# Patient Record
Sex: MALE | Race: BLACK OR AFRICAN AMERICAN | NOT HISPANIC OR LATINO | Employment: FULL TIME | URBAN - METROPOLITAN AREA
[De-identification: names, ages, dates, MRNs, and addresses within clinical notes are randomized per-mention and may not be internally consistent; named-entity substitution may affect disease eponyms.]

---

## 2017-03-30 ENCOUNTER — ANESTHESIA EVENT (OUTPATIENT)
Dept: SURGERY | Facility: HOSPITAL | Age: 22
End: 2017-03-30
Payer: COMMERCIAL

## 2017-03-30 ENCOUNTER — HOSPITAL ENCOUNTER (OUTPATIENT)
Facility: HOSPITAL | Age: 22
Discharge: HOME | End: 2017-03-31
Attending: EMERGENCY MEDICINE | Admitting: HOSPITALIST
Payer: COMMERCIAL

## 2017-03-30 ENCOUNTER — SURGERY (OUTPATIENT)
Age: 22
End: 2017-03-30

## 2017-03-30 ENCOUNTER — ANESTHESIA (OUTPATIENT)
Dept: SURGERY | Facility: HOSPITAL | Age: 22
End: 2017-03-30
Payer: OTHER MISCELLANEOUS

## 2017-03-30 DIAGNOSIS — T14.90XA TRAUMA: Primary | ICD-10-CM

## 2017-03-30 PROBLEM — S92.912B: Status: ACTIVE | Noted: 2017-03-30

## 2017-03-30 PROBLEM — S92.322B: Status: ACTIVE | Noted: 2017-03-30

## 2017-03-30 PROBLEM — S92.312B: Status: ACTIVE | Noted: 2017-03-30

## 2017-03-30 LAB
ALBUMIN SERPL BCP-MCNC: 4.7 G/DL
ALP SERPL-CCNC: 72 U/L
ALT SERPL W/O P-5'-P-CCNC: 18 U/L
ANION GAP SERPL CALC-SCNC: 10 MMOL/L
AST SERPL-CCNC: 33 U/L
BASOPHILS # BLD AUTO: 0 K/UL
BASOPHILS NFR BLD: 0.1 %
BILIRUB SERPL-MCNC: 0.7 MG/DL
BUN SERPL-MCNC: 8 MG/DL
CALCIUM SERPL-MCNC: 10.1 MG/DL
CHLORIDE SERPL-SCNC: 100 MMOL/L
CO2 SERPL-SCNC: 26 MMOL/L
CREAT SERPL-MCNC: 1.1 MG/DL
DIFFERENTIAL METHOD: ABNORMAL
EOSINOPHIL # BLD AUTO: 0 K/UL
EOSINOPHIL NFR BLD: 0 %
ERYTHROCYTE [DISTWIDTH] IN BLOOD BY AUTOMATED COUNT: 12.5 %
EST. GFR  (AFRICAN AMERICAN): >60 ML/MIN/1.73 M^2
EST. GFR  (NON AFRICAN AMERICAN): >60 ML/MIN/1.73 M^2
GLUCOSE SERPL-MCNC: 89 MG/DL
HCT VFR BLD AUTO: 46.1 %
HGB BLD-MCNC: 15.3 G/DL
INR PPP: 1.1
LYMPHOCYTES # BLD AUTO: 1.4 K/UL
LYMPHOCYTES NFR BLD: 11.2 %
MCH RBC QN AUTO: 30.4 PG
MCHC RBC AUTO-ENTMCNC: 33.2 %
MCV RBC AUTO: 92 FL
MONOCYTES # BLD AUTO: 0.3 K/UL
MONOCYTES NFR BLD: 2.5 %
NEUTROPHILS # BLD AUTO: 10.4 K/UL
NEUTROPHILS NFR BLD: 86.2 %
PLATELET # BLD AUTO: 261 K/UL
PMV BLD AUTO: 7.6 FL
POTASSIUM SERPL-SCNC: 4.5 MMOL/L
PROT SERPL-MCNC: 8.2 G/DL
PROTHROMBIN TIME: 11.4 SEC
RBC # BLD AUTO: 5.03 M/UL
SODIUM SERPL-SCNC: 136 MMOL/L
WBC # BLD AUTO: 12.1 K/UL

## 2017-03-30 PROCEDURE — 63600175 PHARM REV CODE 636 W HCPCS: Performed by: ANESTHESIOLOGY

## 2017-03-30 PROCEDURE — 27200651 HC AIRWAY, LMA: Performed by: NURSE ANESTHETIST, CERTIFIED REGISTERED

## 2017-03-30 PROCEDURE — 25000003 PHARM REV CODE 250: Performed by: ANESTHESIOLOGY

## 2017-03-30 PROCEDURE — 99284 EMERGENCY DEPT VISIT MOD MDM: CPT

## 2017-03-30 PROCEDURE — G0378 HOSPITAL OBSERVATION PER HR: HCPCS

## 2017-03-30 PROCEDURE — 80053 COMPREHEN METABOLIC PANEL: CPT

## 2017-03-30 PROCEDURE — 25000003 PHARM REV CODE 250: Performed by: NURSE ANESTHETIST, CERTIFIED REGISTERED

## 2017-03-30 PROCEDURE — 36000707: Performed by: ORTHOPAEDIC SURGERY

## 2017-03-30 PROCEDURE — 20103 EXPL PENTRG WOUND EXTREMITY: CPT | Mod: LT,,, | Performed by: ORTHOPAEDIC SURGERY

## 2017-03-30 PROCEDURE — 25000003 PHARM REV CODE 250: Performed by: EMERGENCY MEDICINE

## 2017-03-30 PROCEDURE — D9220A PRA ANESTHESIA: Mod: ,,, | Performed by: ANESTHESIOLOGY

## 2017-03-30 PROCEDURE — 85610 PROTHROMBIN TIME: CPT

## 2017-03-30 PROCEDURE — 37000008 HC ANESTHESIA 1ST 15 MINUTES: Performed by: ORTHOPAEDIC SURGERY

## 2017-03-30 PROCEDURE — 37000009 HC ANESTHESIA EA ADD 15 MINS: Performed by: ORTHOPAEDIC SURGERY

## 2017-03-30 PROCEDURE — 99900104 DSU ONLY-NO CHARGE-EA ADD'L HR (STAT): Performed by: ORTHOPAEDIC SURGERY

## 2017-03-30 PROCEDURE — 36415 COLL VENOUS BLD VENIPUNCTURE: CPT

## 2017-03-30 PROCEDURE — 36000706: Performed by: ORTHOPAEDIC SURGERY

## 2017-03-30 PROCEDURE — 99900103 DSU ONLY-NO CHARGE-INITIAL HR (STAT): Performed by: ORTHOPAEDIC SURGERY

## 2017-03-30 PROCEDURE — 85025 COMPLETE CBC W/AUTO DIFF WBC: CPT

## 2017-03-30 PROCEDURE — 71000033 HC RECOVERY, INTIAL HOUR: Performed by: ORTHOPAEDIC SURGERY

## 2017-03-30 PROCEDURE — 63600175 PHARM REV CODE 636 W HCPCS: Performed by: NURSE ANESTHETIST, CERTIFIED REGISTERED

## 2017-03-30 RX ORDER — KETOROLAC TROMETHAMINE 30 MG/ML
30 INJECTION, SOLUTION INTRAMUSCULAR; INTRAVENOUS ONCE AS NEEDED
Status: COMPLETED | OUTPATIENT
Start: 2017-03-30 | End: 2017-03-30

## 2017-03-30 RX ORDER — FENTANYL CITRATE 50 UG/ML
INJECTION, SOLUTION INTRAMUSCULAR; INTRAVENOUS
Status: DISPENSED
Start: 2017-03-30 | End: 2017-03-31

## 2017-03-30 RX ORDER — PROPOFOL 10 MG/ML
VIAL (ML) INTRAVENOUS
Status: DISCONTINUED | OUTPATIENT
Start: 2017-03-30 | End: 2017-03-30

## 2017-03-30 RX ORDER — ONDANSETRON 2 MG/ML
4 INJECTION INTRAMUSCULAR; INTRAVENOUS DAILY PRN
Status: DISCONTINUED | OUTPATIENT
Start: 2017-03-30 | End: 2017-03-30 | Stop reason: HOSPADM

## 2017-03-30 RX ORDER — SODIUM CHLORIDE 0.9 % (FLUSH) 0.9 %
3 SYRINGE (ML) INJECTION
Status: DISCONTINUED | OUTPATIENT
Start: 2017-03-30 | End: 2017-03-31 | Stop reason: HOSPADM

## 2017-03-30 RX ORDER — SODIUM CHLORIDE, SODIUM LACTATE, POTASSIUM CHLORIDE, CALCIUM CHLORIDE 600; 310; 30; 20 MG/100ML; MG/100ML; MG/100ML; MG/100ML
INJECTION, SOLUTION INTRAVENOUS CONTINUOUS
Status: DISCONTINUED | OUTPATIENT
Start: 2017-03-30 | End: 2017-03-31

## 2017-03-30 RX ORDER — CEFAZOLIN SODIUM 1 G/3ML
INJECTION, POWDER, FOR SOLUTION INTRAMUSCULAR; INTRAVENOUS
Status: DISCONTINUED | OUTPATIENT
Start: 2017-03-30 | End: 2017-03-30

## 2017-03-30 RX ORDER — ONDANSETRON 4 MG/1
4 TABLET, ORALLY DISINTEGRATING ORAL
Status: COMPLETED | OUTPATIENT
Start: 2017-03-30 | End: 2017-03-30

## 2017-03-30 RX ORDER — AMOXICILLIN AND CLAVULANATE POTASSIUM 562.5; 437.5; 62.5 MG/1; MG/1; MG/1
2 TABLET, FILM COATED, EXTENDED RELEASE ORAL
Status: ON HOLD | COMMUNITY
Start: 2017-03-26 | End: 2017-03-31

## 2017-03-30 RX ORDER — LIDOCAINE HCL/PF 100 MG/5ML
SYRINGE (ML) INTRAVENOUS
Status: DISCONTINUED | OUTPATIENT
Start: 2017-03-30 | End: 2017-03-30

## 2017-03-30 RX ORDER — FENTANYL CITRATE 50 UG/ML
25 INJECTION, SOLUTION INTRAMUSCULAR; INTRAVENOUS EVERY 5 MIN PRN
Status: DISCONTINUED | OUTPATIENT
Start: 2017-03-30 | End: 2017-03-30 | Stop reason: HOSPADM

## 2017-03-30 RX ORDER — ACETAMINOPHEN 10 MG/ML
INJECTION, SOLUTION INTRAVENOUS
Status: DISCONTINUED | OUTPATIENT
Start: 2017-03-30 | End: 2017-03-30

## 2017-03-30 RX ORDER — CEFAZOLIN SODIUM 2 G/50ML
2 SOLUTION INTRAVENOUS ONCE
Status: DISCONTINUED | OUTPATIENT
Start: 2017-03-30 | End: 2017-03-30 | Stop reason: HOSPADM

## 2017-03-30 RX ORDER — SODIUM CHLORIDE 9 MG/ML
INJECTION, SOLUTION INTRAVENOUS CONTINUOUS
Status: DISCONTINUED | OUTPATIENT
Start: 2017-03-30 | End: 2017-03-31

## 2017-03-30 RX ORDER — FENTANYL CITRATE 50 UG/ML
INJECTION, SOLUTION INTRAMUSCULAR; INTRAVENOUS
Status: DISCONTINUED | OUTPATIENT
Start: 2017-03-30 | End: 2017-03-30

## 2017-03-30 RX ORDER — HYDROCODONE BITARTRATE AND ACETAMINOPHEN 5; 325 MG/1; MG/1
1 TABLET ORAL
Status: COMPLETED | OUTPATIENT
Start: 2017-03-30 | End: 2017-03-30

## 2017-03-30 RX ORDER — OXYCODONE AND ACETAMINOPHEN 5; 325 MG/1; MG/1
1 TABLET ORAL
Status: DISCONTINUED | OUTPATIENT
Start: 2017-03-30 | End: 2017-03-30 | Stop reason: HOSPADM

## 2017-03-30 RX ORDER — SODIUM CHLORIDE, SODIUM LACTATE, POTASSIUM CHLORIDE, CALCIUM CHLORIDE 600; 310; 30; 20 MG/100ML; MG/100ML; MG/100ML; MG/100ML
25 INJECTION, SOLUTION INTRAVENOUS CONTINUOUS
Status: DISCONTINUED | OUTPATIENT
Start: 2017-03-30 | End: 2017-03-31

## 2017-03-30 RX ORDER — CEPHALEXIN 250 MG/1
1000 CAPSULE ORAL
Status: COMPLETED | OUTPATIENT
Start: 2017-03-30 | End: 2017-03-30

## 2017-03-30 RX ORDER — FENTANYL CITRATE 50 UG/ML
25 INJECTION, SOLUTION INTRAMUSCULAR; INTRAVENOUS EVERY 10 MIN PRN
Status: DISCONTINUED | OUTPATIENT
Start: 2017-03-30 | End: 2017-03-31 | Stop reason: HOSPADM

## 2017-03-30 RX ADMIN — FENTANYL CITRATE 50 MCG: 50 INJECTION INTRAMUSCULAR; INTRAVENOUS at 08:03

## 2017-03-30 RX ADMIN — HYDROCODONE BITARTRATE AND ACETAMINOPHEN 1 TABLET: 5; 325 TABLET ORAL at 08:03

## 2017-03-30 RX ADMIN — KETOROLAC TROMETHAMINE 30 MG: 30 INJECTION, SOLUTION INTRAMUSCULAR at 08:03

## 2017-03-30 RX ADMIN — ACETAMINOPHEN 1000 MG: 10 INJECTION, SOLUTION INTRAVENOUS at 07:03

## 2017-03-30 RX ADMIN — FENTANYL CITRATE 25 MCG: 50 INJECTION INTRAMUSCULAR; INTRAVENOUS at 07:03

## 2017-03-30 RX ADMIN — FENTANYL CITRATE 50 MCG: 50 INJECTION INTRAMUSCULAR; INTRAVENOUS at 07:03

## 2017-03-30 RX ADMIN — FENTANYL CITRATE 25 MCG: 50 INJECTION INTRAMUSCULAR; INTRAVENOUS at 06:03

## 2017-03-30 RX ADMIN — SODIUM CHLORIDE, SODIUM LACTATE, POTASSIUM CHLORIDE, AND CALCIUM CHLORIDE: .6; .31; .03; .02 INJECTION, SOLUTION INTRAVENOUS at 06:03

## 2017-03-30 RX ADMIN — ONDANSETRON 4 MG: 4 TABLET, ORALLY DISINTEGRATING ORAL at 08:03

## 2017-03-30 RX ADMIN — CEPHALEXIN 1000 MG: 250 CAPSULE ORAL at 09:03

## 2017-03-30 RX ADMIN — CEFAZOLIN 2 G: 1 INJECTION, POWDER, FOR SOLUTION INTRAVENOUS at 07:03

## 2017-03-30 RX ADMIN — LIDOCAINE HYDROCHLORIDE 100 MG: 20 INJECTION, SOLUTION INTRAVENOUS at 07:03

## 2017-03-30 RX ADMIN — PROPOFOL 200 MG: 10 INJECTION, EMULSION INTRAVENOUS at 07:03

## 2017-03-30 NOTE — PROGRESS NOTES
Patient arrived on floor via stretcher. Personal belongings with mother at bedside. Left foot elevated on pillow. Patient complains of pain 4-5. Bed low, brakes locked, SR up x2, call light within reach.     03/30/17 1500   Vital Signs   Temp 97.5 °F (36.4 °C)   Temp src Oral   Pulse 76   Heart Rate Source Monitor   Resp 18   SpO2 100 %   Pulse Oximetry Type Intermittent   O2 Device (Oxygen Therapy) room air   BP (!) 152/91   MAP (mmHg) 120   BP Location Left arm   BP Method Automatic   Patient Position Lying

## 2017-03-30 NOTE — ED NOTES
When dressing taken down, still with significant bleeding from one puncture site. Lt foot again redressed and orthopedist (Teresa) called back @ this time.

## 2017-03-30 NOTE — ED NOTES
Dr. Moore (orthopedics) @ bedside. Still some heavy oozing noted from puncture wound, placed QuickClot to lesion & replaced with new pressure dressing secured by Coban. Plan is to keep leg elevate & wrapped x 1 hour & recheck for breakthrough bleeding. If bleeding continues at this point, Dr. Moore would like to be contacted again & reconsider possible surgical wound irrigation under general anesthesia

## 2017-03-30 NOTE — PLAN OF CARE
Problem: Patient Care Overview  Goal: Plan of Care Review  Outcome: Ongoing (interventions implemented as appropriate)  A&Ox4. Urinates clear, yellow urine using urinal at bedside. Left lower extremity elevated with one pillow. Awaiting surgery per Dr. Moore. VSS. Remained afebrile during my shift. Good pain control with non pharmacological methods. Mother at bedside. Bed low, brakes locked, SR up x2, call light within reach. Verbalizes understanding of care. Open communication utilized.

## 2017-03-30 NOTE — ASSESSMENT & PLAN NOTE
With uncontrolled bleeding. Pressure dressing applied. Ortho consulted. NPO for possible surgery.  Tetanus UTD.  Initiated on IV antibiotics pre op

## 2017-03-30 NOTE — H&P
"[facility] Hospital Medicine  History & Physical    Patient Name: Nathaniel Rolon  MRN: [id number]  Admission Date: 3/30/2017  Attending Physician: Nicholas Kwan MD   Primary Care Provider: No primary care provider on file.         Patient information was obtained from patient and ER records.     Subjective:     Principal Problem:Open fracture of first metatarsal bone of left foot    Chief Complaint:   Chief Complaint   Patient presents with    Foot Injury     LEFT foot crush injury        HPI: Nathaniel Rolon is a 21-year-old male with no significant PMH who presents to the ED for evaluation of injury to the left foot.  Patient states his foot was "caught between an electrical pallet magda and a pallet" earlier today at work. According to the ED note, patient has abrasion to his lateral aspect of the left foot with swelling there is a mild amount of bleeding from 2 less than 1 cm puncture wounds.  Patient has no other injuries or complaints at this time no family history no ALLERGIES his last tetanus was 2 years ago.     ED reports uncontrolled bleeding from puncture site requiring multiple changes of pressure dressing.       History reviewed. No pertinent past medical history.    Past Surgical History:   Procedure Laterality Date    APPENDECTOMY         Review of patient's allergies indicates:  No Known Allergies    No current facility-administered medications on file prior to encounter.      No current outpatient prescriptions on file prior to encounter.     Family History     Problem Relation (Age of Onset)    Arthritis Maternal Grandmother    COPD Maternal Grandfather    Diabetes Maternal Grandmother    Hyperlipidemia Maternal Grandfather    Hypertension Maternal Grandmother, Maternal Grandfather    Stroke Maternal Grandfather        Social History Main Topics    Smoking status: Never Smoker    Smokeless tobacco: Not on file    Alcohol use No    Drug use: No    Sexual activity: Not " on file     Review of Systems   Constitutional: Negative for appetite change, chills and diaphoresis.   HENT: Negative for congestion, hearing loss and sore throat.    Eyes: Negative for pain and itching.   Respiratory: Negative for cough, shortness of breath and wheezing.    Cardiovascular: Negative for chest pain, palpitations and leg swelling.   Gastrointestinal: Negative for abdominal pain, blood in stool and nausea.   Endocrine: Negative for polyphagia and polyuria.   Genitourinary: Negative for dysuria, flank pain and hematuria.   Musculoskeletal: Negative for arthralgias, back pain and myalgias.        Left foot pain and bleeding from puncture site   Neurological: Negative for dizziness, syncope and light-headedness.   Hematological: Negative for adenopathy.   Psychiatric/Behavioral: Negative for agitation and confusion. The patient is not nervous/anxious.      Objective:     Vital Signs (Most Recent):  Temp: 97.5 °F (36.4 °C) (03/30/17 1500)  Pulse: 76 (03/30/17 1500)  Resp: 18 (03/30/17 1500)  BP: (!) 152/91 (03/30/17 1500)  SpO2: 100 % (03/30/17 1500) Vital Signs (24h Range):  Temp:  [97.5 °F (36.4 °C)-98.4 °F (36.9 °C)] 97.5 °F (36.4 °C)  Pulse:  [60-83] 76  Resp:  [16-18] 18  SpO2:  [100 %] 100 %  BP: (134-158)/(68-91) 152/91     Weight: 76.7 kg (169 lb)  There is no height or weight on file to calculate BMI.    Physical Exam   Constitutional: He is oriented to person, place, and time. He appears well-developed and well-nourished.   HENT:   Head: Normocephalic and atraumatic.   Right Ear: External ear normal.   Left Ear: External ear normal.   Nose: Nose normal.   Mouth/Throat: Oropharynx is clear and moist.   Eyes: Conjunctivae and EOM are normal. Pupils are equal, round, and reactive to light.   Neck: Normal range of motion. Neck supple.   Cardiovascular: Normal rate, regular rhythm, normal heart sounds and intact distal pulses.    Pulmonary/Chest: Effort normal and breath sounds normal.   Abdominal:  Soft. Bowel sounds are normal.   Musculoskeletal: Normal range of motion. He exhibits edema and tenderness.   Left foot with mild generalized edema. Dressing intact to mid foot.  + pedal pulses and cap refill intact   Neurological: He is alert and oriented to person, place, and time.   Skin: Skin is warm and dry.   Psychiatric: He has a normal mood and affect. His behavior is normal.   Nursing note and vitals reviewed.       Significant Labs:   BMP:   Recent Labs  Lab 03/30/17  1402   GLU 89      K 4.5      CO2 26   BUN 8   CREATININE 1.1   CALCIUM 10.1     CBC:   Recent Labs  Lab 03/30/17  1402   WBC 12.10   HGB 15.3   HCT 46.1          Significant Imaging: I have reviewed all pertinent imaging results/findings within the past 24 hours.     XR left foot Fractures of the left first and second metatarsals.  Subcutaneous air and soft tissue swelling    Assessment/Plan:     * Open fracture of first metatarsal bone of left foot  With uncontrolled bleeding. Pressure dressing applied. Ortho consulted. NPO for possible surgery.  Tetanus UTD.  Initiated on IV antibiotics pre op      Open fracture of second metatarsal bone of left foot  With uncontrolled bleeding. Pressure dressing applied. Ortho consulted. NPO for possible surgery.       VTE Risk Mitigation     None        Elisa Sheets NP  Department of Hospital Medicine   [facility]

## 2017-03-30 NOTE — ANESTHESIA PREPROCEDURE EVALUATION
03/30/2017  Nathaniel Rolon is a 21 y.o., male.    [facility] Anesthesia Evaluation    I have reviewed the Patient Summary Reports.    I have reviewed the Nursing Notes.   I have reviewed the Medications.     Review of Systems  Anesthesia Hx:  No problems with previous Anesthesia    Social:  Non-Smoker    Cardiovascular:  Cardiovascular Normal Exercise tolerance: good     Pulmonary:  Pulmonary Normal    Renal/:  Renal/ Normal     Hepatic/GI:  Hepatic/GI Normal    Musculoskeletal:  Musculoskeletal Normal    Neurological:  Neurology Normal    Endocrine:  Endocrine Normal    Psych:  Psychiatric Normal           Physical Exam  General:  Well nourished    Airway/Jaw/Neck:  Airway Findings:      Chest/Lungs:  Chest/Lungs Findings: Clear to auscultation, Normal Respiratory Rate     Heart/Vascular:  Heart Findings: Rate: Normal  Rhythm: Regular Rhythm  Sounds: Normal        Mental Status:  Mental Status Findings:  Cooperative, Alert and Oriented         Anesthesia Plan  Type of Anesthesia, risks & benefits discussed:  Anesthesia Type:  general  Patient's Preference:   Intra-op Monitoring Plan: standard ASA monitors  Intra-op Monitoring Plan Comments:   Post Op Pain Control Plan:   Post Op Pain Control Plan Comments:   Induction:    Beta Blocker:  Patient is not currently on a Beta-Blocker (No further documentation required).       Informed Consent: Patient understands risks and agrees with Anesthesia plan.  Questions answered. Anesthesia consent signed with patient.  ASA Score: 1     Day of Surgery Review of History & Physical: I have interviewed and examined the patient. I have reviewed the patient's H&P dated:  There are no significant changes.      Anesthesia Plan Notes: gen c lma        Ready For Surgery From Anesthesia Perspective.

## 2017-03-30 NOTE — IP AVS SNAPSHOT
[address] [place] [address]  Phone: [phone]           Patient Discharge Instructions   Our goal is to set you up for success. This packet includes information on your condition, medications, and your home care.  It will help you care for yourself to prevent having to return to the hospital.     Please ask your nurse if you have any questions.      There are many details to remember when preparing to leave the hospital. Here is what you will need to do:    1. Take your medicine. If you are prescribed medications, review your Medication List on the following pages. You may have new medications to  at the pharmacy and others that you'll need to stop taking. Review the instructions for how and when to take your medications. Talk with your doctor or nurses if you are unsure of what to do.     2. Go to your follow-up appointments. Specific follow-up information is listed in the following pages. Your may be contacted by a nurse or clinical provider about future appointments. Be sure we have all of the phone numbers to reach you. Please contact your provider's office if you are unable to make an appointment.     3. Watch for warning signs. Your doctor or nurse will give you detailed warning signs to watch for and when to call for assistance. These instructions may also include educational information about your condition. If you experience any of warning signs to your health, call your doctor.           [facility]  Unless otherwise directed by your provider, please   contact [facility], our nurse care line   that is available for 24/7 assistance.     [phone] (toll-free)     Registered nurses in the [facility]   provide: appointment scheduling, clinical advisement, health education, and other advisory services.                  ** Verify the list of medication(s) below is accurate and up to date. Carry this with you in case of  emergency. If your medications have changed, please notify your healthcare provider.             Medication List      CHANGE how you take these medications        Additional Info                      amoxicillin-clavulanate 1,000-62.5 mg 1,000-62.5 mg per tablet   Commonly known as:  AUGMENTIN XR   Quantity:  20 tablet   Refills:  0   Dose:  2 tablet   Indications:  URI   What changed:  when to take this    Instructions:  Take 2 tablets by mouth every 12 (twelve) hours.     Begin Date    AM    Noon    PM    Bedtime            Where to Get Your Medications      You can get these medications from any pharmacy     Bring a paper prescription for each of these medications     amoxicillin-clavulanate 1,000-62.5 mg 1,000-62.5 mg per tablet                  Please bring to all follow up appointments:    1. A copy of your discharge instructions.  2. All medicines you are currently taking in their original bottles.  3. Identification and insurance card.    Please arrive 15 minutes ahead of scheduled appointment time.    Please call 24 hours in advance if you must reschedule your appointment and/or time.        Follow-up Information     Follow up with Ulysses Moore MD In 1 week.    Specialties:  Sports Medicine, Orthopedic Surgery    Why:  follow up in one week (Friday)    Contact information:    [address] [place] [address]  [place] [address]  [phone]          Discharge Instructions     Future Orders    Diet general     Questions:    Total calories:      Fat restriction, if any:      Protein restriction, if any:      Na restriction, if any:      Fluid restriction:      Additional restrictions:          Discharge Instructions       Thank you for choosing [facility] for your medical care. The primary doctor who is taking care of you at the time of your discharge is Nicholas Kwan MD.     You were admitted to the hospital with Open fracture of first metatarsal bone of left foot.     Please note your discharge  "instructions, including diet/activity restrictions, follow-up appointments, and medication changes.  If you have any questions about your medical issues, prescriptions, or any other questions, please feel free to contact the [facility] Medicine Dept at [phone] and we will help.    If you are previously with Home health, outpatient PT/OT or under a therapy program, you are cleared to return to those programs.    Please direct all long term medication refills and follow up to your primary care provider, No primary care provider on file.. Thank you again for letting us take care of your health care needs.    Discharge References/Attachments     CRUTCHES (NON-WEIGHT-BEARING), DISCHARGE INSTRUCTIONS (ENGLISH)    CRUTCHES, USING: NON-WEIGHT-BEARING (ENGLISH)        Primary Diagnosis     Your primary diagnosis was:  Open Fracture Of First Metatarsal Bone Of Left Foot      Admission Information     Date & Time Provider Department CSN    3/30/2017  8:01 AM Nicholas Kwan MD [facility] [id number]      Care Providers     Provider Role Specialty Primary office phone    Nicholas Kwan MD Attending Provider Rheumatology [phone]    Ulysses Moore MD Surgeon  Sports Medicine [phone]      Your Vitals Were     BP Pulse Temp Resp Height Weight    121/70 74 98.2 °F (36.8 °C) (Oral) 16 5' 9" (1.753 m) 76.7 kg (169 lb)    SpO2 BMI             98% 24.96 kg/m2         Recent Lab Values     No lab values to display.      Allergies as of 3/31/2017     No Known Allergies      Advance Directives     An advance directive is a document which, in the event you are no longer able to make decisions for yourself, tells your healthcare team what kind of treatment you do or do not want to receive, or who you would like to make those decisions for you.  If you do not currently have an advance directive, [facility] encourages you to create one.  For more information call:  [phone]), " [phone] ([phone]),  or log on to [email or web address].        Language Assistance Services     ATTENTION: Language assistance services are available, free of charge. Please call [phone].      ATENCIÓN: Si chancela maico, tiene a lizarraga disposición servicios gratuitos de asistencia lingüística. Llame al [phone].     CHÚ Ý: N?u b?n nói Ti?ng Vi?t, có các d?ch v? h? tr? ngôn ng? mi?n phí dành cho b?n. G?i s? [phone].        [facility] Sign-Up     Activating your [facility] account is as easy as 1-2-3!     1) Visit [email or web address], select Sign Up Now, enter this activation code and your date of birth, then select Next.  [email or web address]  Expires: 5/15/2017  9:10 AM      2) Create a username and password to use when you visit [facility] in the future and select a security question in case you lose your password and select Next.    3) Enter your e-mail address and click Sign Up!    Additional Information  If you have questions, please e-mail [email or web address] or call [phone] to talk to our [facility] staff. Remember, [facility] is NOT to be used for urgent needs. For medical emergencies, dial 911.          [facility] complies with applicable Federal civil rights laws and does not discriminate on the basis of race, color, national origin, age, disability, or sex.

## 2017-03-30 NOTE — ED NOTES
Current pressure dressing intact without bleed through noted. Resting with eyes closed. Mother in attendance. Awaiting arrival of orthopedist for eval

## 2017-03-30 NOTE — ED NOTES
"S/P Lt foot injury ("crushed between pallet magda & wall") - Lt foot (especially, lateral aspect & dorsal arch) swelling with 2 each puncture wounds (punctate, approx 2mm-3mm each). Bleeding controlled with elevated & pressure bandage  "

## 2017-03-30 NOTE — ED NOTES
Prior to building splint, it was revealed that punctate lesions were again bleeding. Orthopedist consulted by ER MD & pressue dressing reapplied. ER MD advised family that orthopedist was currently in surgery but he wanted to come down & see patient

## 2017-03-30 NOTE — ASSESSMENT & PLAN NOTE
With uncontrolled bleeding. Pressure dressing applied. Ortho consulted. NPO for possible surgery.

## 2017-03-30 NOTE — ED NOTES
"Appears to be resting comfortably, when asked about pain he states "some better" after meds. When pressed for a pain scale number, he stated "7.5/10". LLE elevated, & ice pack applied. Mother in attendance  "

## 2017-03-30 NOTE — SUBJECTIVE & OBJECTIVE
History reviewed. No pertinent past medical history.    Past Surgical History:   Procedure Laterality Date    APPENDECTOMY         Review of patient's allergies indicates:  No Known Allergies    No current facility-administered medications on file prior to encounter.      No current outpatient prescriptions on file prior to encounter.     Family History     Problem Relation (Age of Onset)    Arthritis Maternal Grandmother    COPD Maternal Grandfather    Diabetes Maternal Grandmother    Hyperlipidemia Maternal Grandfather    Hypertension Maternal Grandmother, Maternal Grandfather    Stroke Maternal Grandfather        Social History Main Topics    Smoking status: Never Smoker    Smokeless tobacco: Not on file    Alcohol use No    Drug use: No    Sexual activity: Not on file     Review of Systems   Constitutional: Negative for appetite change, chills and diaphoresis.   HENT: Negative for congestion, hearing loss and sore throat.    Eyes: Negative for pain and itching.   Respiratory: Negative for cough, shortness of breath and wheezing.    Cardiovascular: Negative for chest pain, palpitations and leg swelling.   Gastrointestinal: Negative for abdominal pain, blood in stool and nausea.   Endocrine: Negative for polyphagia and polyuria.   Genitourinary: Negative for dysuria, flank pain and hematuria.   Musculoskeletal: Negative for arthralgias, back pain and myalgias.        Left foot pain and bleeding from puncture site   Neurological: Negative for dizziness, syncope and light-headedness.   Hematological: Negative for adenopathy.   Psychiatric/Behavioral: Negative for agitation and confusion. The patient is not nervous/anxious.      Objective:     Vital Signs (Most Recent):  Temp: 97.5 °F (36.4 °C) (03/30/17 1500)  Pulse: 76 (03/30/17 1500)  Resp: 18 (03/30/17 1500)  BP: (!) 152/91 (03/30/17 1500)  SpO2: 100 % (03/30/17 1500) Vital Signs (24h Range):  Temp:  [97.5 °F (36.4 °C)-98.4 °F (36.9 °C)] 97.5 °F (36.4  °C)  Pulse:  [60-83] 76  Resp:  [16-18] 18  SpO2:  [100 %] 100 %  BP: (134-158)/(68-91) 152/91     Weight: 76.7 kg (169 lb)  There is no height or weight on file to calculate BMI.    Physical Exam   Constitutional: He is oriented to person, place, and time. He appears well-developed and well-nourished.   HENT:   Head: Normocephalic and atraumatic.   Right Ear: External ear normal.   Left Ear: External ear normal.   Nose: Nose normal.   Mouth/Throat: Oropharynx is clear and moist.   Eyes: Conjunctivae and EOM are normal. Pupils are equal, round, and reactive to light.   Neck: Normal range of motion. Neck supple.   Cardiovascular: Normal rate, regular rhythm, normal heart sounds and intact distal pulses.    Pulmonary/Chest: Effort normal and breath sounds normal.   Abdominal: Soft. Bowel sounds are normal.   Musculoskeletal: Normal range of motion. He exhibits edema and tenderness.   Left foot with mild generalized edema. Dressing intact to mid foot.  + pedal pulses and cap refill intact   Neurological: He is alert and oriented to person, place, and time.   Skin: Skin is warm and dry.   Psychiatric: He has a normal mood and affect. His behavior is normal.   Nursing note and vitals reviewed.       Significant Labs:   BMP:   Recent Labs  Lab 03/30/17  1402   GLU 89      K 4.5      CO2 26   BUN 8   CREATININE 1.1   CALCIUM 10.1     CBC:   Recent Labs  Lab 03/30/17  1402   WBC 12.10   HGB 15.3   HCT 46.1          Significant Imaging: I have reviewed all pertinent imaging results/findings within the past 24 hours.     XR left foot Fractures of the left first and second metatarsals.  Subcutaneous air and soft tissue swelling

## 2017-03-30 NOTE — ED PROVIDER NOTES
Encounter Date: 3/30/2017       History     Chief Complaint   Patient presents with    Foot Injury     LEFT foot crush injury     Review of patient's allergies indicates:  No Known Allergies  HPI Comments: 21-year-old male presents complaining of injury to left foot.  Patient sandwiched his foot between a pallet magda and a pallet earlier today.  Patient has abrasion to his lateral aspect of the left foot with swelling there is a mild amount of bleeding from 2 less than 1 cm puncture wounds.  Patient has full range of motion of ankle and reports pain only in the foot.  Patient has no other injuries or complaints at this time no family history no ALLERGIES his last tetanus was 2 years ago.    The history is provided by the patient.     History reviewed. No pertinent past medical history.  Past Surgical History:   Procedure Laterality Date    APPENDECTOMY       Family History   Problem Relation Age of Onset    Arthritis Maternal Grandmother     Hypertension Maternal Grandmother     Diabetes Maternal Grandmother     Stroke Maternal Grandfather     Hypertension Maternal Grandfather     Hyperlipidemia Maternal Grandfather     COPD Maternal Grandfather      Social History   Substance Use Topics    Smoking status: Never Smoker    Smokeless tobacco: None    Alcohol use No     Review of Systems   Constitutional: Negative for activity change, appetite change, chills, diaphoresis, fatigue and fever.   HENT: Negative for congestion, rhinorrhea, sore throat, trouble swallowing and voice change.    Eyes: Negative for visual disturbance.   Respiratory: Negative for cough, choking, chest tightness, shortness of breath, wheezing and stridor.    Cardiovascular: Negative for chest pain, palpitations and leg swelling.   Gastrointestinal: Negative for abdominal distention, abdominal pain, blood in stool, constipation, diarrhea, nausea and vomiting.   Genitourinary: Negative for difficulty urinating, dysuria, flank pain and  frequency.   Musculoskeletal: Negative for arthralgias, back pain, joint swelling, myalgias and neck pain.   Skin: Positive for wound. Negative for color change and rash.   Neurological: Negative for dizziness, syncope, speech difficulty, weakness, numbness and headaches.   Hematological: Negative for adenopathy. Does not bruise/bleed easily.   All other systems reviewed and are negative.      Physical Exam   Initial Vitals   BP Pulse Resp Temp SpO2   03/30/17 0758 03/30/17 0758 03/30/17 0758 03/30/17 0758 03/30/17 0758   134/81 78 16 98.4 °F (36.9 °C) 100 %     Physical Exam    Nursing note and vitals reviewed.  Constitutional: He appears well-developed and well-nourished. He is not diaphoretic. No distress.   HENT:   Head: Normocephalic and atraumatic.   Right Ear: External ear normal.   Left Ear: External ear normal.   Nose: Nose normal.   Mouth/Throat: Oropharynx is clear and moist. No oropharyngeal exudate.   Eyes: Conjunctivae and EOM are normal. Pupils are equal, round, and reactive to light. Right eye exhibits no discharge. Left eye exhibits no discharge. No scleral icterus.   Neck: Normal range of motion. Neck supple. No thyromegaly present. No tracheal deviation present. No JVD present.   Cardiovascular: Normal rate, regular rhythm, normal heart sounds and intact distal pulses. Exam reveals no gallop and no friction rub.    No murmur heard.  Pulmonary/Chest: Breath sounds normal. No stridor. No respiratory distress. He has no wheezes. He has no rhonchi. He has no rales. He exhibits no tenderness.   Abdominal: Soft. Bowel sounds are normal. He exhibits no distension and no mass. There is no tenderness. There is no rebound and no guarding.   Musculoskeletal: Normal range of motion. He exhibits no tenderness.   Patient has swelling to the lateral aspect of the left dorsal foot there is mild bleeding from 2 puncture wounds which are approximately a millimeter in diameter, there is no crepitus capillary refill  is less than 2 seconds patient has full range of motion of his extremity, no other apparent injuries pulses are 2+   Lymphadenopathy:     He has no cervical adenopathy.   Neurological: He is alert and oriented to person, place, and time. He has normal strength and normal reflexes. He displays normal reflexes. No cranial nerve deficit or sensory deficit.   Skin: Skin is warm and dry. No rash noted. No erythema.         ED Course   Procedures  Labs Reviewed   CBC W/ AUTO DIFFERENTIAL - Abnormal; Notable for the following:        Result Value    MPV 7.6 (*)     Gran # 10.4 (*)     Gran% 86.2 (*)     Lymph% 11.2 (*)     Mono% 2.5 (*)     All other components within normal limits   PROTIME-INR   COMPREHENSIVE METABOLIC PANEL          X-Rays:   Independently Interpreted Readings:   Other Readings:  X-ray of foot: Patient has fracture to proximal portion of first and second metatarsal    Medical Decision Making:   History:   Old Medical Records: I decided to obtain old medical records.  Initial Assessment:   Emergent evaluation of a 21-year-old male presenting with foot swelling and bleeding differential diagnosis includes fracture, soft tissue injury, sprain  Independently Interpreted Test(s):   I have ordered and independently interpreted X-rays - see prior notes.  Clinical Tests:   Lab Tests: Ordered and Reviewed  Radiological Study: Ordered and Reviewed              Attending Attestation:             Attending ED Notes:   Case discussed with Dr. Moore of orthopedics, he reviewed the films and can see patient in clinic in the next 1-2 days.  Recommends patient be started on Keflex and pain medication he recommends patient be placed in a short leg splint and given crutches patient to call for an appointment with orthopedics in the morning and cautioned to return immediately to the ER for any worsening or any further concerns.    Addendum: Despite pressure dressing patient noted to still have active bleeding through his  dressing from these puncture sites I have conferred with orthopedist and he has come to see the patient.  Quick clot applied and pressure dressing applied we are to observe the patient for one hour if bleeding persists then patient will be taken to the OR for evaluation otherwise patient is okay to be discharged home and will be seen in clinic.    Addendum: Patient noted to be bleeding through his dressing even with quick clot, patient admitted to internal medicine with orthopedist to follow.          ED Course     Clinical Impression:   The encounter diagnosis was Trauma.          Jeffery Graves MD  03/30/17 1534

## 2017-03-30 NOTE — ED NOTES
Placed in hospital gown in prep for eventual admit/surgery as previously discussed if bleeding was not well controlled

## 2017-03-31 VITALS
DIASTOLIC BLOOD PRESSURE: 70 MMHG | HEIGHT: 69 IN | BODY MASS INDEX: 25.03 KG/M2 | WEIGHT: 169 LBS | OXYGEN SATURATION: 98 % | SYSTOLIC BLOOD PRESSURE: 121 MMHG | TEMPERATURE: 98 F | RESPIRATION RATE: 16 BRPM | HEART RATE: 74 BPM

## 2017-03-31 PROBLEM — Z92.89: Status: ACTIVE | Noted: 2017-03-31

## 2017-03-31 LAB
ANION GAP SERPL CALC-SCNC: 8 MMOL/L
BASOPHILS # BLD AUTO: 0 K/UL
BASOPHILS NFR BLD: 0.5 %
BUN SERPL-MCNC: 9 MG/DL
CALCIUM SERPL-MCNC: 9.4 MG/DL
CHLORIDE SERPL-SCNC: 103 MMOL/L
CO2 SERPL-SCNC: 26 MMOL/L
CREAT SERPL-MCNC: 0.9 MG/DL
DIFFERENTIAL METHOD: ABNORMAL
EOSINOPHIL # BLD AUTO: 0.1 K/UL
EOSINOPHIL NFR BLD: 1.3 %
ERYTHROCYTE [DISTWIDTH] IN BLOOD BY AUTOMATED COUNT: 12.3 %
EST. GFR  (AFRICAN AMERICAN): >60 ML/MIN/1.73 M^2
EST. GFR  (NON AFRICAN AMERICAN): >60 ML/MIN/1.73 M^2
GLUCOSE SERPL-MCNC: 91 MG/DL
HCT VFR BLD AUTO: 40.2 %
HGB BLD-MCNC: 13.7 G/DL
LYMPHOCYTES # BLD AUTO: 1.5 K/UL
LYMPHOCYTES NFR BLD: 21.9 %
MCH RBC QN AUTO: 30.7 PG
MCHC RBC AUTO-ENTMCNC: 33.9 %
MCV RBC AUTO: 91 FL
MONOCYTES # BLD AUTO: 1.1 K/UL
MONOCYTES NFR BLD: 15.7 %
NEUTROPHILS # BLD AUTO: 4.1 K/UL
NEUTROPHILS NFR BLD: 60.6 %
PLATELET # BLD AUTO: 239 K/UL
PMV BLD AUTO: 7.5 FL
POTASSIUM SERPL-SCNC: 4.3 MMOL/L
RBC # BLD AUTO: 4.45 M/UL
SODIUM SERPL-SCNC: 137 MMOL/L
WBC # BLD AUTO: 6.8 K/UL

## 2017-03-31 PROCEDURE — 85025 COMPLETE CBC W/AUTO DIFF WBC: CPT

## 2017-03-31 PROCEDURE — 99024 POSTOP FOLLOW-UP VISIT: CPT | Mod: ,,, | Performed by: ORTHOPAEDIC SURGERY

## 2017-03-31 PROCEDURE — 36415 COLL VENOUS BLD VENIPUNCTURE: CPT

## 2017-03-31 PROCEDURE — 80048 BASIC METABOLIC PNL TOTAL CA: CPT

## 2017-03-31 PROCEDURE — 25000003 PHARM REV CODE 250: Performed by: ORTHOPAEDIC SURGERY

## 2017-03-31 PROCEDURE — G0378 HOSPITAL OBSERVATION PER HR: HCPCS

## 2017-03-31 RX ORDER — AMOXICILLIN AND CLAVULANATE POTASSIUM 562.5; 437.5; 62.5 MG/1; MG/1; MG/1
2 TABLET, FILM COATED, EXTENDED RELEASE ORAL EVERY 12 HOURS
Qty: 20 TABLET | Refills: 0 | Status: SHIPPED | OUTPATIENT
Start: 2017-03-31 | End: 2017-04-05

## 2017-03-31 RX ORDER — IBUPROFEN 400 MG/1
800 TABLET ORAL 3 TIMES DAILY
Status: DISCONTINUED | OUTPATIENT
Start: 2017-03-31 | End: 2017-03-31 | Stop reason: HOSPADM

## 2017-03-31 RX ORDER — HYDROCODONE BITARTRATE AND ACETAMINOPHEN 5; 325 MG/1; MG/1
1 TABLET ORAL EVERY 4 HOURS PRN
Status: DISCONTINUED | OUTPATIENT
Start: 2017-03-31 | End: 2017-03-31 | Stop reason: HOSPADM

## 2017-03-31 RX ORDER — HYDROCODONE BITARTRATE AND ACETAMINOPHEN 10; 325 MG/1; MG/1
1 TABLET ORAL EVERY 4 HOURS PRN
Status: DISCONTINUED | OUTPATIENT
Start: 2017-03-31 | End: 2017-03-31 | Stop reason: HOSPADM

## 2017-03-31 RX ADMIN — IBUPROFEN 800 MG: 400 TABLET, FILM COATED ORAL at 06:03

## 2017-03-31 NOTE — PLAN OF CARE
Pt sleepy, but answering questions appropriately, denies pain at this time and nausea, tolerating clear liquids, dressing cdi, bilat pedal pulses +2, lle warm  Dr. Bloom at bedside and states pt meets anesthesia criteria to transfer to room

## 2017-03-31 NOTE — TRANSFER OF CARE
"Anesthesia Transfer of Care Note    Patient: Nathaniel Rolon    Procedure(s) Performed: Procedure(s) (LRB):  IRRIGATION AND DRAINAGE (Left)  WOUND EXPLORATION WITH LIGATION OF ARTERY (Left)    Patient location: PACU    Anesthesia Type: general    Transport from OR: Transported from OR on 2-3 L/min O2 by NC with adequate spontaneous ventilation    Post pain: adequate analgesia    Post assessment: no apparent anesthetic complications    Post vital signs: stable    Level of consciousness: sedated    Nausea/Vomiting: no nausea/vomiting    Complications: none          Last vitals:   Visit Vitals    BP (!) 170/80 (BP Location: Right arm, Patient Position: Lying, BP Method: Automatic)    Pulse 72    Temp 37.2 °C (99 °F) (Oral)    Resp 16    Ht 5' 9" (1.753 m)    Wt 76.7 kg (169 lb)    SpO2 100%    BMI 24.96 kg/m2     "

## 2017-03-31 NOTE — PROGRESS NOTES
"Nathaniel Rolon is a 21 y.o. male patient.    Follow up For:  Left foot vascular injury, fractures.    Scheduled Meds:   ibuprofen  800 mg Oral TID     Continuous Infusions:   PRN Meds:fentaNYL, hydrocodone-acetaminophen 10-325mg, hydrocodone-acetaminophen 5-325mg, sodium chloride 0.9%    Review of patient's allergies indicates:  No Known Allergies    Vital Signs (Most Recent)  Temp: 98.2 °F (36.8 °C) (03/31/17 0400)  Pulse: 74 (03/31/17 0400)  Resp: 16 (03/31/17 0400)  BP: 121/70 (03/31/17 0400)  SpO2: 98 % (03/31/17 0400)    Vital Signs Range (Last 24H):  Temp:  [97.5 °F (36.4 °C)-99 °F (37.2 °C)]   Pulse:  [60-83]   Resp:  [10-21]   BP: (121-170)/(58-96)   SpO2:  [96 %-100 %]     Subjective:   Diet: Adequate intake.    Activity level: Returning to normal.    Pain control: Well controlled.      Objective:  Vital signs (most recent): Blood pressure 121/70, pulse 74, temperature 98.2 °F (36.8 °C), temperature source Oral, resp. rate 16, height 5' 9" (1.753 m), weight 76.7 kg (169 lb), SpO2 98 %.  General appearance: Comfortable.    Wound: There is no drainage.      Assessment:   Post-op: 1 day.    Condition: In stable condition.     Plan:  Discharge home.  Crutch training.      Pt reports no pain this morning. Minimal swelling. Sensation intact. Painless toe ROM. Dressing dry.    Plan for d/c today from ortho standpoint. Non-weight bearing. Keep splint and dressing on and dry. Follow up my office next Friday.  "

## 2017-03-31 NOTE — ANESTHESIA POSTPROCEDURE EVALUATION
"Anesthesia Post Evaluation    Patient: Nathaniel Rolon    Procedure(s) Performed: Procedure(s) (LRB):  IRRIGATION AND DRAINAGE (Left)  WOUND EXPLORATION WITH LIGATION OF ARTERY (Left)    Final Anesthesia Type: general  Patient location during evaluation: PACU  Patient participation: Yes- Able to Participate  Level of consciousness: awake and alert and oriented  Post-procedure vital signs: reviewed and stable  Pain management: adequate  Airway patency: patent  PONV status at discharge: No PONV  Anesthetic complications: no      Cardiovascular status: hemodynamically stable  Respiratory status: unassisted, spontaneous ventilation and room air  Hydration status: euvolemic  Follow-up not needed.        Visit Vitals    BP (!) 170/80 (BP Location: Right arm, Patient Position: Lying, BP Method: Automatic)    Pulse 72    Temp 37.2 °C (99 °F) (Oral)    Resp 16    Ht 5' 9" (1.753 m)    Wt 76.7 kg (169 lb)    SpO2 100%    BMI 24.96 kg/m2       Pain/Ni Score: Pain Assessment Performed: Yes (3/30/2017  6:00 PM)  Presence of Pain: complains of pain/discomfort (3/30/2017  6:00 PM)  Pain Rating Prior to Med Admin: 6 (3/30/2017  6:33 PM)  Pain Rating Post Med Admin: 4 (3/30/2017  6:43 PM)      "

## 2017-03-31 NOTE — DISCHARGE INSTRUCTIONS
Thank you for choosing [facility] for your medical care. The primary doctor who is taking care of you at the time of your discharge is Nicholas Kwan MD.     You were admitted to the hospital with Open fracture of first metatarsal bone of left foot.     Please note your discharge instructions, including diet/activity restrictions, follow-up appointments, and medication changes.  If you have any questions about your medical issues, prescriptions, or any other questions, please feel free to contact the [facility] Medicine Dept at [phone] and we will help.    If you are previously with Home health, outpatient PT/OT or under a therapy program, you are cleared to return to those programs.    Please direct all long term medication refills and follow up to your primary care provider, No primary care provider on file.. Thank you again for letting us take care of your health care needs.

## 2017-03-31 NOTE — BRIEF OP NOTE
[facility]  Brief Operative Note    SUMMARY     Surgery Date: 3/30/2017     Surgeon(s) and Role:     * Ulysses Moore MD - Primary    Assisting Surgeon: None    Pre-op Diagnosis:  Bleeding from wound [T14.8]    Post-op Diagnosis:  Post-Op Diagnosis Codes:     * Bleeding from wound [T14.8]    Procedure(s) (LRB):  IRRIGATION AND DRAINAGE (Left)  WOUND EXPLORATION WITH LIGATION OF ARTERY (Left)    Anesthesia: General    Description of Procedure: left foot exploration with ligation of arterial bleeder, incision and debridement of wound    Description of the findings of the procedure: See Dictation     Estimated Blood Loss: 25 mL         Specimens:   Specimen     None      DATE OF PROCEDURE:  03/30/2017    PREOPERATIVE DIAGNOSES:  1.  Crush injury to left foot with uncontrolled bleeding.  2.  Fracture of first and second metatarsal.    POSTOPERATIVE DIAGNOSES:  1.  Crush injury to left foot with uncontrolled bleeding.  2.  Fracture of first and second metatarsal.    PROCEDURES:  1.  Exploration of foot with ligation of arterial bleed.  2.  Incision and debridement of crush wound.    SURGEON:  Ulysses Moore M.D.    ASSISTANT:  Rosetta Mitchell.    ANESTHESIA:  General.    HISTORY:  Mr. Rolon is a 21-year-old gentleman who sustained an injury this   morning at work when a pallet lift landed on his foot.  He was brought to the   Emergency Room where x-rays were taken and he was found to have a fracture of   his first and second metatarsal along with an abrasion and a puncture wound over   the more lateral aspect of his foot.  The Emergency Room initially treated him   with splinting and dressing the wound; however, he continued to bleed.  He was   evaluated in the ER and found to have a soft dorsum of the foot and painless   active and passive range of motion of the toes; however, he continued to bleed.    We applied a compression dressing and he continued to bleed.  He was initially   discharged home  and then returned with continued bleeding of the wound.  At this   point, we discussed with him treatment options, which included opening of the   crush wound and exploration looking for a source of the bleeding.  The risks and   benefits of surgical intervention including the potential for incomplete pain   relief, continued bleeding and potential infection were explained to the patient   who expressed that he understood and wished to proceed.  Informed consent was   obtained.    PROCEDURE IN DETAIL:  After verifying informed consent and correct site, the   patient was brought back to the Operating Room, placed on the OR table in a   supine position.  Preoperative IV antibiotics were administered and a timeout   was performed.  The patient's left lower extremity was then prepped and draped   in sterile fashion.  We began by creating a 2 cm incision over the dorsolateral   wound site from his crush injury.  Dissection was carried down through the skin   and subcutaneous tissue.  We did have a return of hematoma, but no evidence of   bleeding.  We then extended the incision distally and proximally for a total of   4 cm; and when we extended it distally, we found a small artery which was   actively pumping blood.  The ends of the artery were identified and ligated   using silk ties and then the artery was transected.  At this point, we had our   bleeding under control.  We continued to explore the foot looking for other   sources of bleeding.  There was no active bleeding anywhere throughout the foot.    We did feel the compartments and they were soft throughout the foot in his   preoperative examination. Incision and exploration included skin and subcutaneous tissue,   but not muscle or bone. We had him actively and passively move his toes in pre-op,   which were minimally painful for him and so we were not concerned for a   compartment syndrome at this time.  We then copiously irrigated the wound using   3 L of  normal saline and a Pulsavac.  We then did a closure of the wound for its   entire length using 2-0 nylon suture.  With the wound closed, we then applied a   Xeroform dressing and a compressive dressing along with placing his foot into a   posterior splint to immobilize his fractures.  The patient was then awoken from   anesthesia, extubated, and brought to the PACU in good condition.    TOTAL TOURNIQUET TIME:  None.    DRAINS:  None.    SPECIMENS:  None.    COMPLICATIONS:  None.    ESTIMATED BLOOD LOSS:  Minimal.    POSTOPERATIVE PLAN:  He will be admitted for overnight observation and hopefully   discharged home in the morning.        /gulshan [id number] blank(s)        MONICA  dd: 03/30/2017 20:43:40 (CDT)  td: 03/31/2017 01:59:03 (CDT)  Doc ID   #[id number]  Job ID #[id number]    CC:     [id number]

## 2017-03-31 NOTE — PROGRESS NOTES
PIV removed per MD order.  Intact and pressure applied.  No redness or swelling noted to IV site.  Patient given written and verbal discharge instructions.  Patient verbalized understanding of all instructions.   Patient given crutches and crutch instructions.  Patient left unit via WC escorted by PCT.  No complications.

## 2017-03-31 NOTE — PLAN OF CARE
03/31/17 1042   Final Note   Assessment Type Final Discharge Note   Discharge Disposition Home   Discharge planning education complete? Yes

## 2017-04-01 NOTE — ASSESSMENT & PLAN NOTE
Active arterial bleeding due to puncture wound. Underwent ligation of arterial bleeding, since resolved. Appreciate Dr. Moore support. Patient stable for discharge and to follow up with Dr. Moore in one week.

## 2017-04-01 NOTE — ASSESSMENT & PLAN NOTE
With uncontrolled bleeding. Pressure dressing applied. Ortho consulted. Surgery completed, tolerated well.  Discharge with crutches, augmentin. Follow up with Dr. Moore.

## 2017-04-01 NOTE — DISCHARGE SUMMARY
"[facility] Medicine  Discharge Summary      Patient Name: Nathaniel Rolon  MRN: [id number]  Admission Date: 3/30/2017  Hospital Length of Stay: 0 days  Discharge Date and Time: 3/31/2017 10:54 AM  Attending Physician: No att. providers found   Discharging Provider: Nicholas Kwan MD  Primary Care Provider: Primary Doctor No      HPI:   Nathaniel Rolon is a 21-year-old male with no significant PMH who presents to the ED for evaluation of injury to the left foot.  Patient states his foot was "caught between an electrical pallet magda and a pallet" earlier today at work. According to the ED note, patient has abrasion to his lateral aspect of the left foot with swelling there is a mild amount of bleeding from 2 less than 1 cm puncture wounds.  Patient has no other injuries or complaints at this time no family history no ALLERGIES his last tetanus was 2 years ago.     Procedure(s) (LRB):  IRRIGATION AND DRAINAGE (Left)  WOUND EXPLORATION WITH LIGATION OF ARTERY (Left)      Indwelling Lines/Drains at time of discharge:   Lines/Drains/Airways          No matching active lines, drains, or airways        Hospital Course:   Patient admitted with ongoing bleeding after puncture wound to foot and associated fractures. Given empiric antibiotics and orthopedic surgery consulted.   Patient underwent irrigation and drainage and exploration of wound with ligation of arterial bleed per Dr. Moore of ortho which was successful.   He was prescribed course of augmentin and crutches with ortho follow up. Non-weight bearing.        Consults:     Significant Diagnostic Studies: Labs: All labs within the past 24 hours have been reviewed  Radiology:     Xray foot complete, left  Impression       Fractures of the left first and second metatarsals.  Subcutaneous air and soft tissue swelling.        Electronically signed by: SHAVON POWERS MD  Date: 03/30/17  Time: 08:31      Xray ankle complete, left  Impression "       Fractures of the left first and second metatarsals.  Subcutaneous air and soft tissue swelling.        Electronically signed by: SHAVON POWERS MD  Date: 03/30/17  Time: 08:31        Pending Diagnostic Studies:     None        Final Active Diagnoses:    Diagnosis Date Noted POA    PRINCIPAL PROBLEM:  Open fracture of first metatarsal bone of left foot [S92.312B] 03/30/2017 Yes    History of arterial puncture within last 7 days [Z78.9] 03/31/2017 Yes    Open fracture of second metatarsal bone of left foot [S92.322B] 03/30/2017 Yes      Problems Resolved During this Admission:    Diagnosis Date Noted Date Resolved POA      * Open fracture of first metatarsal bone of left foot  With uncontrolled bleeding. Pressure dressing applied. Ortho consulted. Surgery completed, tolerated well.  Discharge with crutches, augmentin. Follow up with Dr. Moore.      Open fracture of second metatarsal bone of left foot  Non weight bearing; follow up with Dr. Moore      History of arterial puncture within last 7 days  Active arterial bleeding due to puncture wound. Underwent ligation of arterial bleeding, since resolved. Appreciate Dr. Moore support. Patient stable for discharge and to follow up with Dr. Moore in one week.         Discharged Condition: good    Disposition: Home or Self Care    Follow Up:  Follow-up Information     Follow up with Ulysses Moore MD In 1 week.    Specialties:  Sports Medicine, Orthopedic Surgery    Why:  follow up in one week (Friday) -  scheduled on 4-7-17 @ 9:45 a.m.     Contact information:    [address] [place] [address]  [place] [address]  [phone]          Patient Instructions:     Diet general       Medications:  Reconciled Home Medications:   Discharge Medication List as of 3/31/2017  9:56 AM      CONTINUE these medications which have CHANGED    Details   amoxicillin-clavulanate 1,000-62.5 mg (AUGMENTIN XR) 1,000-62.5 mg per tablet Take 2 tablets by mouth every 12 (twelve) hours.,  Starting 3/31/2017, Until Wed 4/5/17, Print           Time spent on the discharge of patient: 30 minutes    Nicholas Kwan MD  Department of Hospital Medicine  [facility]

## 2017-04-03 ENCOUNTER — TELEPHONE (OUTPATIENT)
Dept: ORTHOPEDICS | Facility: CLINIC | Age: 22
End: 2017-04-03

## 2017-04-03 RX ORDER — HYDROCODONE BITARTRATE AND ACETAMINOPHEN 7.5; 325 MG/1; MG/1
1-2 TABLET ORAL EVERY 4 HOURS PRN
Qty: 40 TABLET | Refills: 0 | Status: SHIPPED | OUTPATIENT
Start: 2017-04-03

## 2017-04-03 NOTE — TELEPHONE ENCOUNTER
----- Message from Lourdes Rivera sent at 4/3/2017 11:48 AM CDT -----  Contact: mother (Angela)  Per mother - pt is requesting pain medication for surgery on 3/30/17. Per mother - Pt requested  Rx to be picked up by mother (Angela).  Please call pt when Rx is ready for  [phone].  Thanks

## 2017-04-03 NOTE — TELEPHONE ENCOUNTER
RETURNED CALL TO LORENZA, LEFT MESSAGE REQUESTING SHE CALL ME BACK [phone] RE: MEDICATION REQUEST.

## 2017-04-04 ENCOUNTER — TELEPHONE (OUTPATIENT)
Dept: ORTHOPEDICS | Facility: CLINIC | Age: 22
End: 2017-04-04

## 2017-04-04 NOTE — TELEPHONE ENCOUNTER
Returned call to Agnieszka.  Advised that pt is aware of appt as I spoke with his mother yesterday and that was the best time for her to bring him.  Gave her number to [employer] to coordinate the workers comp info.

## 2017-04-04 NOTE — TELEPHONE ENCOUNTER
----- Message from Saritha Leonard sent at 4/4/2017  1:17 PM CDT -----  Nurse Case Management from [facility]/Agnieszka [phone] would like to get the patient's appointment on 4/7/17 changed to 4/7/17 afternoon or 4/6/17 morning. She has not talked to patient about changing the appointment. Please call to advise.

## 2017-04-04 NOTE — TELEPHONE ENCOUNTER
----- Message from Saritha Leonard sent at 4/4/2017  1:17 PM CDT -----  Nurse Case Management from [facility]/Agnieszka [phone] would like to get the patient's appointment on 4/7/17 changed to 4/7/17 afternoon or 4/6/17 morning. She has not talked to patient about changing the appointment. Please call to advise.

## 2017-04-06 DIAGNOSIS — M79.672 LEFT FOOT PAIN: Primary | ICD-10-CM

## 2017-04-06 DIAGNOSIS — M25.572 LEFT ANKLE PAIN, UNSPECIFIED CHRONICITY: ICD-10-CM

## 2017-04-07 ENCOUNTER — OFFICE VISIT (OUTPATIENT)
Dept: ORTHOPEDICS | Facility: CLINIC | Age: 22
End: 2017-04-07
Payer: COMMERCIAL

## 2017-04-07 ENCOUNTER — HOSPITAL ENCOUNTER (OUTPATIENT)
Dept: RADIOLOGY | Facility: HOSPITAL | Age: 22
Discharge: HOME | End: 2017-04-07
Attending: ORTHOPAEDIC SURGERY
Payer: OTHER MISCELLANEOUS

## 2017-04-07 ENCOUNTER — HOSPITAL ENCOUNTER (OUTPATIENT)
Dept: RADIOLOGY | Facility: HOSPITAL | Age: 22
Discharge: HOME | End: 2017-04-07
Attending: ORTHOPAEDIC SURGERY
Payer: COMMERCIAL

## 2017-04-07 VITALS
SYSTOLIC BLOOD PRESSURE: 156 MMHG | WEIGHT: 169 LBS | HEART RATE: 85 BPM | DIASTOLIC BLOOD PRESSURE: 108 MMHG | BODY MASS INDEX: 25.03 KG/M2 | HEIGHT: 69 IN

## 2017-04-07 DIAGNOSIS — M79.672 LEFT FOOT PAIN: ICD-10-CM

## 2017-04-07 DIAGNOSIS — S92.302D CLOSED DISPLACED FRACTURE OF METATARSAL BONE OF LEFT FOOT WITH ROUTINE HEALING, UNSPECIFIED METATARSAL, SUBSEQUENT ENCOUNTER: Primary | ICD-10-CM

## 2017-04-07 DIAGNOSIS — M25.572 LEFT ANKLE PAIN, UNSPECIFIED CHRONICITY: ICD-10-CM

## 2017-04-07 DIAGNOSIS — S97.82XS CRUSH INJURY OF FOOT, LEFT, SEQUELA: ICD-10-CM

## 2017-04-07 PROCEDURE — 99999 PR PBB SHADOW E&M-EST. PATIENT-LVL III: CPT | Mod: PBBFAC,,, | Performed by: ORTHOPAEDIC SURGERY

## 2017-04-07 PROCEDURE — 73630 X-RAY EXAM OF FOOT: CPT | Mod: 26,LT,, | Performed by: RADIOLOGY

## 2017-04-07 PROCEDURE — 73610 X-RAY EXAM OF ANKLE: CPT | Mod: TC,PN,LT

## 2017-04-07 PROCEDURE — 99024 POSTOP FOLLOW-UP VISIT: CPT | Mod: S$GLB,,, | Performed by: ORTHOPAEDIC SURGERY

## 2017-04-07 PROCEDURE — 73610 X-RAY EXAM OF ANKLE: CPT | Mod: 26,LT,, | Performed by: RADIOLOGY

## 2017-04-07 NOTE — PROGRESS NOTES
History reviewed. No pertinent past medical history.    Past Surgical History:   Procedure Laterality Date    APPENDECTOMY         Current Outpatient Prescriptions   Medication Sig    hydrocodone-acetaminophen 7.5-325mg (NORCO) 7.5-325 mg per tablet Take 1-2 tablets by mouth every 4 (four) hours as needed for Pain (q 4-6 HOURS).     No current facility-administered medications for this visit.        Review of patient's allergies indicates:  No Known Allergies    Family History   Problem Relation Age of Onset    Arthritis Maternal Grandmother     Hypertension Maternal Grandmother     Diabetes Maternal Grandmother     Stroke Maternal Grandfather     Hypertension Maternal Grandfather     Hyperlipidemia Maternal Grandfather     COPD Maternal Grandfather        Social History     Social History    Marital status: Single     Spouse name: N/A    Number of children: N/A    Years of education: N/A     Occupational History    Not on file.     Social History Main Topics    Smoking status: Never Smoker    Smokeless tobacco: Not on file    Alcohol use No    Drug use: No    Sexual activity: Not on file     Other Topics Concern    Not on file     Social History Narrative       Chief Complaint:   Chief Complaint   Patient presents with    Foot Injury     L foot 1 wk f/u       Consulting Physician: Ulysses Moore MD    History of present illness:    This is a 21 y.o. year old male who complains of left foot pain following a crush injury on 3/30/17.  He was seen and evaluated in the ER and taken back for surgery that day for continued bleeding.  He was found to have an arterial laceration which was ligated.  He was also found to have a fracture of his first and second metatarsals.  He has been in a splint since surgery.  He reports his pain today at a 2 out of 10.  He is nonweightbearing.    Review of Systems:    Constitution: Denies chills, fever, and sweats.  HENT: Denies headaches or blurry  "vision.  Cardiovascular: Denies chest pain or irregular heart beat.  Respiratory: Denies cough or shortness of breath.  Gastrointestinal: Denies abdominal pain, nausea, or vomiting.  Musculoskeletal:  Denies muscle cramps.  Neurological: Denies dizziness or focal weakness.  Psychiatric/Behavioral: Normal mental status.  Hematologic/Lymphatic: Denies bleeding problem or easy bruising/bleeding.  Skin: Denies rash or suspicious lesions.    Examination:    Vital Signs:    Vitals:    04/07/17 1454   BP: (!) 156/108   Pulse: 85   Weight: 76.7 kg (169 lb)   Height: 5' 9" (1.753 m)   PainSc:   2   PainLoc: Foot       Body mass index is 24.96 kg/(m^2).    This a well-developed, well nourished patient in no acute distress.    Alert and oriented and cooperative to examination.       Physical Exam: Left Foot Exam     Gait:   Non-weight bearing    Skin  Rash:   None  Scars:   Incision clean and dry    Inspection   Deformity:   None  Erythema:   None  Bruising:   mild  Swelling:   mild  Masses:  None    Range of Motion  Not tested due to fracture    Muscle Strength  Not tested due to fracture    Other   Tenderness:  Diffuse  Instability:  Not tested due to fracture  Sensation:   Normal    Vascular Exam   Capillary refill:     Normal        Imaging: X-rays ordered and reviewed today show a nondisplaced fracture of the first metatarsal.  The second metatarsal is minimally displaced in the AP view but slightly displaced in the lateral view.        Assessment: Closed displaced fracture of metatarsal bone of left foot with routine healing, unspecified metatarsal, subsequent encounter    Crush injury of foot, left, sequela        Plan:  His foot looks good today.  We will switch him into a boot but keep him nonweightbearing.  Will encourage him to do hydrogen peroxide cleaning of the incision every day.  We'll see him back next week to remove his stitches and check x-rays again.      DISCLAIMER: This note may have been dictated using " voice recognition software and may contain grammatical errors.     NOTE: Consult report sent to referring provider via [employer] EMR.

## 2017-04-17 DIAGNOSIS — M79.672 LEFT FOOT PAIN: Primary | ICD-10-CM

## 2017-04-18 ENCOUNTER — HOSPITAL ENCOUNTER (OUTPATIENT)
Dept: RADIOLOGY | Facility: HOSPITAL | Age: 22
Discharge: HOME | End: 2017-04-18
Attending: ORTHOPAEDIC SURGERY
Payer: COMMERCIAL

## 2017-04-18 ENCOUNTER — OFFICE VISIT (OUTPATIENT)
Dept: ORTHOPEDICS | Facility: CLINIC | Age: 22
End: 2017-04-18
Payer: OTHER MISCELLANEOUS

## 2017-04-18 VITALS
WEIGHT: 169 LBS | HEART RATE: 94 BPM | DIASTOLIC BLOOD PRESSURE: 87 MMHG | BODY MASS INDEX: 25.03 KG/M2 | HEIGHT: 69 IN | SYSTOLIC BLOOD PRESSURE: 142 MMHG

## 2017-04-18 DIAGNOSIS — M79.672 LEFT FOOT PAIN: ICD-10-CM

## 2017-04-18 DIAGNOSIS — S97.82XS CRUSH INJURY OF FOOT, LEFT, SEQUELA: ICD-10-CM

## 2017-04-18 DIAGNOSIS — S92.302D CLOSED DISPLACED FRACTURE OF METATARSAL BONE OF LEFT FOOT WITH ROUTINE HEALING, UNSPECIFIED METATARSAL, SUBSEQUENT ENCOUNTER: Primary | ICD-10-CM

## 2017-04-18 PROCEDURE — 99999 PR PBB SHADOW E&M-EST. PATIENT-LVL III: CPT | Mod: PBBFAC,,, | Performed by: ORTHOPAEDIC SURGERY

## 2017-04-18 PROCEDURE — 99024 POSTOP FOLLOW-UP VISIT: CPT | Mod: S$GLB,,, | Performed by: ORTHOPAEDIC SURGERY

## 2017-04-18 PROCEDURE — 73630 X-RAY EXAM OF FOOT: CPT | Mod: TC,PN,LT

## 2017-04-18 PROCEDURE — 73630 X-RAY EXAM OF FOOT: CPT | Mod: 26,LT,, | Performed by: RADIOLOGY

## 2017-04-18 NOTE — PROGRESS NOTES
History reviewed. No pertinent past medical history.    Past Surgical History:   Procedure Laterality Date    APPENDECTOMY         Current Outpatient Prescriptions   Medication Sig    hydrocodone-acetaminophen 7.5-325mg (NORCO) 7.5-325 mg per tablet Take 1-2 tablets by mouth every 4 (four) hours as needed for Pain (q 4-6 HOURS).     No current facility-administered medications for this visit.        Review of patient's allergies indicates:  No Known Allergies    Family History   Problem Relation Age of Onset    Arthritis Maternal Grandmother     Hypertension Maternal Grandmother     Diabetes Maternal Grandmother     Stroke Maternal Grandfather     Hypertension Maternal Grandfather     Hyperlipidemia Maternal Grandfather     COPD Maternal Grandfather        Social History     Social History    Marital status: Single     Spouse name: N/A    Number of children: N/A    Years of education: N/A     Occupational History    Not on file.     Social History Main Topics    Smoking status: Never Smoker    Smokeless tobacco: Not on file    Alcohol use No    Drug use: No    Sexual activity: Not on file     Other Topics Concern    Not on file     Social History Narrative       Chief Complaint:   Chief Complaint   Patient presents with    Foot Injury     DOI: 3/30/17 LEFT FOOT FRACTURE       Consulting Physician: No ref. provider found    History of present illness:    This is a 21 y.o. year old male who complains of left foot pain following a crush injury on 3/30/17.  He was seen and evaluated in the ER and taken back for surgery that day for continued bleeding.  He was found to have an arterial laceration which was ligated.  He was also found to have a fracture of his first and second metatarsals.  He has been in a boot since surgery.  He reports his pain today at a 0 out of 10.  He is nonweightbearing.    Review of Systems:    Constitution: Denies chills, fever, and sweats.  HENT: Denies headaches or blurry  "vision.  Cardiovascular: Denies chest pain or irregular heart beat.  Respiratory: Denies cough or shortness of breath.  Gastrointestinal: Denies abdominal pain, nausea, or vomiting.  Musculoskeletal:  Denies muscle cramps.  Neurological: Denies dizziness or focal weakness.  Psychiatric/Behavioral: Normal mental status.  Hematologic/Lymphatic: Denies bleeding problem or easy bruising/bleeding.  Skin: Denies rash or suspicious lesions.    Examination:    Vital Signs:    Vitals:    04/18/17 1554   BP: (!) 142/87   Pulse: 94   Weight: 76.7 kg (169 lb)   Height: 5' 9" (1.753 m)   PainSc: 0-No pain   PainLoc: Foot       Body mass index is 24.96 kg/(m^2).    This a well-developed, well nourished patient in no acute distress.    Alert and oriented and cooperative to examination.       Physical Exam: Left Foot Exam     Gait:   Non-weight bearing    Skin  Rash:   None  Scars:   Incision clean and dry    Inspection   Deformity:   None  Erythema:   None  Bruising:   mild  Swelling:   mild  Masses:  None    Range of Motion  Not tested due to fracture    Muscle Strength  Not tested due to fracture    Other   Tenderness:  minimal  Instability:  Not tested due to fracture  Sensation:   Normal    Vascular Exam   Capillary refill:     Normal        Imaging: X-rays ordered and reviewed today show a nondisplaced fracture of the first metatarsal.  The second metatarsal is minimally displaced in the AP view but slightly displaced in the lateral view.        Assessment: Closed displaced fracture of metatarsal bone of left foot with routine healing, unspecified metatarsal, subsequent encounter    Crush injury of foot, left, sequela        Plan:  We'll continue to keep him in a boot.  We will keep him nonweightbearing.  We'll ask him to do gentle range of motion exercises nonweightbearing.  We'll see him back in 4 weeks' time and hopefully be able to progress him.    DISCLAIMER: This note may have been dictated using voice recognition " software and may contain grammatical errors.     NOTE: Consult report sent to referring provider via [employer] EMR.

## 2017-05-15 DIAGNOSIS — S92.302D CLOSED DISPLACED FRACTURE OF METATARSAL BONE OF LEFT FOOT WITH ROUTINE HEALING, UNSPECIFIED METATARSAL, SUBSEQUENT ENCOUNTER: Primary | ICD-10-CM

## 2017-05-16 ENCOUNTER — HOSPITAL ENCOUNTER (OUTPATIENT)
Dept: RADIOLOGY | Facility: HOSPITAL | Age: 22
Discharge: HOME | End: 2017-05-16
Attending: ORTHOPAEDIC SURGERY
Payer: COMMERCIAL

## 2017-05-16 ENCOUNTER — OFFICE VISIT (OUTPATIENT)
Dept: ORTHOPEDICS | Facility: CLINIC | Age: 22
End: 2017-05-16
Payer: COMMERCIAL

## 2017-05-16 VITALS
DIASTOLIC BLOOD PRESSURE: 92 MMHG | BODY MASS INDEX: 25.03 KG/M2 | SYSTOLIC BLOOD PRESSURE: 133 MMHG | HEART RATE: 78 BPM | WEIGHT: 169 LBS | HEIGHT: 69 IN

## 2017-05-16 DIAGNOSIS — S92.302D CLOSED DISPLACED FRACTURE OF METATARSAL BONE OF LEFT FOOT WITH ROUTINE HEALING, UNSPECIFIED METATARSAL, SUBSEQUENT ENCOUNTER: ICD-10-CM

## 2017-05-16 DIAGNOSIS — S92.302D CLOSED DISPLACED FRACTURE OF METATARSAL BONE OF LEFT FOOT WITH ROUTINE HEALING, UNSPECIFIED METATARSAL, SUBSEQUENT ENCOUNTER: Primary | ICD-10-CM

## 2017-05-16 DIAGNOSIS — S97.82XS CRUSH INJURY OF FOOT, LEFT, SEQUELA: ICD-10-CM

## 2017-05-16 PROCEDURE — 99213 OFFICE O/P EST LOW 20 MIN: CPT | Mod: S$GLB,,, | Performed by: ORTHOPAEDIC SURGERY

## 2017-05-16 PROCEDURE — 73630 X-RAY EXAM OF FOOT: CPT | Mod: TC,PN,LT

## 2017-05-16 PROCEDURE — 73630 X-RAY EXAM OF FOOT: CPT | Mod: 26,LT,, | Performed by: RADIOLOGY

## 2017-05-16 PROCEDURE — 99999 PR PBB SHADOW E&M-EST. PATIENT-LVL III: CPT | Mod: PBBFAC,,, | Performed by: ORTHOPAEDIC SURGERY

## 2017-05-16 NOTE — PROGRESS NOTES
History reviewed. No pertinent past medical history.    Past Surgical History:   Procedure Laterality Date    APPENDECTOMY         Current Outpatient Prescriptions   Medication Sig    hydrocodone-acetaminophen 7.5-325mg (NORCO) 7.5-325 mg per tablet Take 1-2 tablets by mouth every 4 (four) hours as needed for Pain (q 4-6 HOURS).     No current facility-administered medications for this visit.        Review of patient's allergies indicates:  No Known Allergies    Family History   Problem Relation Age of Onset    Arthritis Maternal Grandmother     Hypertension Maternal Grandmother     Diabetes Maternal Grandmother     Stroke Maternal Grandfather     Hypertension Maternal Grandfather     Hyperlipidemia Maternal Grandfather     COPD Maternal Grandfather        Social History     Social History    Marital status: Single     Spouse name: N/A    Number of children: N/A    Years of education: N/A     Occupational History    Not on file.     Social History Main Topics    Smoking status: Never Smoker    Smokeless tobacco: Not on file    Alcohol use No    Drug use: No    Sexual activity: Not on file     Other Topics Concern    Not on file     Social History Narrative       Chief Complaint:   Chief Complaint   Patient presents with    Foot Injury     FX OF METATARSAL BONE OF LEFT FOOT 3/30/17       Consulting Physician: No ref. provider found    History of present illness:    This is a 21 y.o. year old male who complains of left foot pain following a crush injury on 3/30/17.  He was seen and evaluated in the ER and taken back for surgery that day for continued bleeding.  He was found to have an arterial laceration which was ligated.  He was also found to have a fracture of his first and second metatarsals.  He has been NWB in a boot since surgery.  He reports his pain today at a 0 out of 10.    Review of Systems:    Constitution: Denies chills, fever, and sweats.  HENT: Denies headaches or blurry  "vision.  Cardiovascular: Denies chest pain or irregular heart beat.  Respiratory: Denies cough or shortness of breath.  Gastrointestinal: Denies abdominal pain, nausea, or vomiting.  Musculoskeletal:  Denies muscle cramps.  Neurological: Denies dizziness or focal weakness.  Psychiatric/Behavioral: Normal mental status.  Hematologic/Lymphatic: Denies bleeding problem or easy bruising/bleeding.  Skin: Denies rash or suspicious lesions.    Examination:    Vital Signs:    Vitals:    05/16/17 1355   BP: (!) 133/92   Pulse: 78   Weight: 76.7 kg (169 lb)   Height: 5' 9" (1.753 m)   PainSc: 0-No pain   PainLoc: Foot       Body mass index is 24.96 kg/(m^2).    This a well-developed, well nourished patient in no acute distress.    Alert and oriented and cooperative to examination.       Physical Exam: Left Foot Exam     Gait:   Non-weight bearing    Skin  Rash:   None  Scars:   2 cm of wound healing, no drainage    Inspection   Deformity:   None  Erythema:   None  Bruising:   none  Swelling:   none  Masses:  None    Range of Motion  Near normal    Muscle Strength  Not tested due to fracture    Other   Tenderness:  minimal  Instability:  Not tested due to fracture  Sensation:   Normal    Vascular Exam   Capillary refill:     Normal        Imaging: X-rays ordered and reviewed today show a nondisplaced fracture of the first metatarsal.  The second metatarsal is minimally displaced in the AP view but slightly displaced in the lateral view. Some evidence of healing.       Assessment: Closed displaced fracture of metatarsal bone of left foot with routine healing, unspecified metatarsal, subsequent encounter    Crush injury of foot, left, sequela        Plan:  We'll continue to keep him in a boot but allow him to WB as tolerated. Continue daily wound care. We'll see him back in 4 weeks' time.    DISCLAIMER: This note may have been dictated using voice recognition software and may contain grammatical errors.     NOTE: Consult report " sent to referring provider via [employer].

## 2017-05-17 ENCOUNTER — TELEPHONE (OUTPATIENT)
Dept: ORTHOPEDICS | Facility: CLINIC | Age: 22
End: 2017-05-17

## 2017-05-17 NOTE — TELEPHONE ENCOUNTER
Returned Agnieszka's call, she advised that she vd work restrictions form and records yesterday.  She denied any other need at this time.

## 2017-05-17 NOTE — TELEPHONE ENCOUNTER
----- Message from Cheyenne Pepe sent at 5/16/2017  8:11 AM CDT -----  Contact: JANE ANGLIN ([facility]) [phone]  Please call in reference to future appt

## 2017-06-12 DIAGNOSIS — S92.302D CLOSED DISPLACED FRACTURE OF METATARSAL BONE OF LEFT FOOT WITH ROUTINE HEALING, UNSPECIFIED METATARSAL, SUBSEQUENT ENCOUNTER: Primary | ICD-10-CM

## 2017-06-13 ENCOUNTER — HOSPITAL ENCOUNTER (OUTPATIENT)
Dept: RADIOLOGY | Facility: HOSPITAL | Age: 22
Discharge: HOME | End: 2017-06-13
Attending: ORTHOPAEDIC SURGERY
Payer: COMMERCIAL

## 2017-06-13 ENCOUNTER — OFFICE VISIT (OUTPATIENT)
Dept: ORTHOPEDICS | Facility: CLINIC | Age: 22
End: 2017-06-13
Payer: COMMERCIAL

## 2017-06-13 VITALS
DIASTOLIC BLOOD PRESSURE: 88 MMHG | HEART RATE: 88 BPM | BODY MASS INDEX: 25.04 KG/M2 | WEIGHT: 169.06 LBS | SYSTOLIC BLOOD PRESSURE: 138 MMHG | HEIGHT: 69 IN

## 2017-06-13 DIAGNOSIS — S92.302D CLOSED DISPLACED FRACTURE OF METATARSAL BONE OF LEFT FOOT WITH ROUTINE HEALING, UNSPECIFIED METATARSAL, SUBSEQUENT ENCOUNTER: Primary | ICD-10-CM

## 2017-06-13 DIAGNOSIS — S92.302D CLOSED DISPLACED FRACTURE OF METATARSAL BONE OF LEFT FOOT WITH ROUTINE HEALING, UNSPECIFIED METATARSAL, SUBSEQUENT ENCOUNTER: ICD-10-CM

## 2017-06-13 PROCEDURE — 99024 POSTOP FOLLOW-UP VISIT: CPT | Mod: S$GLB,,, | Performed by: ORTHOPAEDIC SURGERY

## 2017-06-13 PROCEDURE — 99999 PR PBB SHADOW E&M-EST. PATIENT-LVL III: CPT | Mod: PBBFAC,,, | Performed by: ORTHOPAEDIC SURGERY

## 2017-06-13 PROCEDURE — 73630 X-RAY EXAM OF FOOT: CPT | Mod: TC,PN,LT

## 2017-06-13 PROCEDURE — 73630 X-RAY EXAM OF FOOT: CPT | Mod: 26,LT,, | Performed by: RADIOLOGY

## 2017-06-13 NOTE — PROGRESS NOTES
History reviewed. No pertinent past medical history.    Past Surgical History:   Procedure Laterality Date    APPENDECTOMY         Current Outpatient Prescriptions   Medication Sig    hydrocodone-acetaminophen 7.5-325mg (NORCO) 7.5-325 mg per tablet Take 1-2 tablets by mouth every 4 (four) hours as needed for Pain (q 4-6 HOURS).     No current facility-administered medications for this visit.        Review of patient's allergies indicates:  No Known Allergies    Family History   Problem Relation Age of Onset    Arthritis Maternal Grandmother     Hypertension Maternal Grandmother     Diabetes Maternal Grandmother     Stroke Maternal Grandfather     Hypertension Maternal Grandfather     Hyperlipidemia Maternal Grandfather     COPD Maternal Grandfather        Social History     Social History    Marital status: Single     Spouse name: N/A    Number of children: N/A    Years of education: N/A     Occupational History    Not on file.     Social History Main Topics    Smoking status: Never Smoker    Smokeless tobacco: Not on file    Alcohol use No    Drug use: No    Sexual activity: Not on file     Other Topics Concern    Not on file     Social History Narrative    No narrative on file       Chief Complaint:   Chief Complaint   Patient presents with    Foot Injury     fx of metatarsal bone of (L) foot 3/30/17       Consulting Physician: No ref. provider found    History of present illness:    This is a 21 y.o. year old male who complains of left foot pain following a crush injury on 3/30/17.  He was seen and evaluated in the ER and taken back for surgery that day for continued bleeding.  He was found to have an arterial laceration which was ligated.  He was also found to have a fracture of his first and second metatarsals.  He reports his pain today at a 0 out of 10. He is weightbearing in normal shoewear.    Review of Systems:    Constitution: Denies chills, fever, and sweats.  HENT: Denies headaches  "or blurry vision.  Cardiovascular: Denies chest pain or irregular heart beat.  Respiratory: Denies cough or shortness of breath.  Gastrointestinal: Denies abdominal pain, nausea, or vomiting.  Musculoskeletal:  Denies muscle cramps.  Neurological: Denies dizziness or focal weakness.  Psychiatric/Behavioral: Normal mental status.  Hematologic/Lymphatic: Denies bleeding problem or easy bruising/bleeding.  Skin: Denies rash or suspicious lesions.    Examination:    Vital Signs:    Vitals:    06/13/17 1501   BP: 138/88   Pulse: 88   Weight: 76.7 kg (169 lb 1.5 oz)   Height: 5' 9" (1.753 m)   PainSc: 0-No pain   PainLoc: Foot       Body mass index is 24.97 kg/m².    This a well-developed, well nourished patient in no acute distress.    Alert and oriented and cooperative to examination.       Physical Exam: Left Foot Exam     Gait:   Normal    Skin  Rash:   None  Scars:   Clean scab    Inspection   Deformity:   None  Erythema:   None  Bruising:   none  Swelling:   none  Masses:  None    Range of Motion  Normal    Muscle Strength  Normal    Other   Tenderness:  none  Instability:  None  Sensation:   Normal    Vascular Exam   Capillary refill:     Normal        Imaging: X-rays ordered and reviewed today show a nondisplaced fracture of the first metatarsal.  The second metatarsal is minimally displaced in the AP view but slightly displaced in the lateral view. Some evidence of healing.       Assessment: Closed displaced fracture of metatarsal bone of left foot with routine healing, unspecified metatarsal, subsequent encounter        Plan:  We'll continue to allow him to WB as tolerated. Continue daily wound care. We'll see him back in 4 weeks' time. At that time we should be able to release him to return to work.    DISCLAIMER: This note may have been dictated using voice recognition software and may contain grammatical errors.     NOTE: Consult report sent to referring provider via [employer] EMR.  "

## 2017-07-07 DIAGNOSIS — S92.302D CLOSED DISPLACED FRACTURE OF METATARSAL BONE OF LEFT FOOT WITH ROUTINE HEALING, UNSPECIFIED METATARSAL, SUBSEQUENT ENCOUNTER: Primary | ICD-10-CM

## 2017-07-11 ENCOUNTER — HOSPITAL ENCOUNTER (OUTPATIENT)
Dept: RADIOLOGY | Facility: HOSPITAL | Age: 22
Discharge: HOME | End: 2017-07-11
Attending: ORTHOPAEDIC SURGERY
Payer: COMMERCIAL

## 2017-07-11 ENCOUNTER — OFFICE VISIT (OUTPATIENT)
Dept: ORTHOPEDICS | Facility: CLINIC | Age: 22
End: 2017-07-11
Payer: COMMERCIAL

## 2017-07-11 VITALS
DIASTOLIC BLOOD PRESSURE: 66 MMHG | HEIGHT: 69 IN | HEART RATE: 69 BPM | SYSTOLIC BLOOD PRESSURE: 125 MMHG | BODY MASS INDEX: 25.04 KG/M2 | WEIGHT: 169.06 LBS

## 2017-07-11 DIAGNOSIS — S92.302D CLOSED DISPLACED FRACTURE OF METATARSAL BONE OF LEFT FOOT WITH ROUTINE HEALING, UNSPECIFIED METATARSAL, SUBSEQUENT ENCOUNTER: ICD-10-CM

## 2017-07-11 DIAGNOSIS — S92.302D CLOSED DISPLACED FRACTURE OF METATARSAL BONE OF LEFT FOOT WITH ROUTINE HEALING, UNSPECIFIED METATARSAL, SUBSEQUENT ENCOUNTER: Primary | ICD-10-CM

## 2017-07-11 DIAGNOSIS — S97.82XS CRUSH INJURY OF FOOT, LEFT, SEQUELA: ICD-10-CM

## 2017-07-11 PROCEDURE — 99213 OFFICE O/P EST LOW 20 MIN: CPT | Mod: S$GLB,,, | Performed by: ORTHOPAEDIC SURGERY

## 2017-07-11 PROCEDURE — 73630 X-RAY EXAM OF FOOT: CPT | Mod: TC,PN,LT

## 2017-07-11 PROCEDURE — 99999 PR PBB SHADOW E&M-EST. PATIENT-LVL III: CPT | Mod: PBBFAC,,, | Performed by: ORTHOPAEDIC SURGERY

## 2017-07-11 PROCEDURE — 73630 X-RAY EXAM OF FOOT: CPT | Mod: 26,LT,, | Performed by: RADIOLOGY

## 2017-07-13 NOTE — PROGRESS NOTES
History reviewed. No pertinent past medical history.    Past Surgical History:   Procedure Laterality Date    APPENDECTOMY         Current Outpatient Prescriptions   Medication Sig    hydrocodone-acetaminophen 7.5-325mg (NORCO) 7.5-325 mg per tablet Take 1-2 tablets by mouth every 4 (four) hours as needed for Pain (q 4-6 HOURS).     No current facility-administered medications for this visit.        Review of patient's allergies indicates:  No Known Allergies    Family History   Problem Relation Age of Onset    Arthritis Maternal Grandmother     Hypertension Maternal Grandmother     Diabetes Maternal Grandmother     Stroke Maternal Grandfather     Hypertension Maternal Grandfather     Hyperlipidemia Maternal Grandfather     COPD Maternal Grandfather        Social History     Social History    Marital status: Single     Spouse name: N/A    Number of children: N/A    Years of education: N/A     Occupational History    Not on file.     Social History Main Topics    Smoking status: Never Smoker    Smokeless tobacco: Not on file    Alcohol use No    Drug use: No    Sexual activity: Not on file     Other Topics Concern    Not on file     Social History Narrative    No narrative on file       Chief Complaint:   Chief Complaint   Patient presents with    Foot Injury     fx of metatarsal bone DOI:3/30/17       Consulting Physician: No ref. provider found    History of present illness:    This is a 21 y.o. year old male who complains of left foot pain following a crush injury on 3/30/17.  He was seen and evaluated in the ER and taken back for surgery that day for continued bleeding.  He was found to have an arterial laceration which was ligated.  He was also found to have a fracture of his first and second metatarsals.  He reports his pain today at a 0 out of 10. He is weightbearing in normal shoewear.    Review of Systems:    Constitution: Denies chills, fever, and sweats.  HENT: Denies headaches or  "blurry vision.  Cardiovascular: Denies chest pain or irregular heart beat.  Respiratory: Denies cough or shortness of breath.  Gastrointestinal: Denies abdominal pain, nausea, or vomiting.  Musculoskeletal:  Denies muscle cramps.  Neurological: Denies dizziness or focal weakness.  Psychiatric/Behavioral: Normal mental status.  Hematologic/Lymphatic: Denies bleeding problem or easy bruising/bleeding.  Skin: Denies rash or suspicious lesions.    Examination:    Vital Signs:    Vitals:    07/11/17 1522   BP: 125/66   Pulse: 69   Weight: 76.7 kg (169 lb 1.5 oz)   Height: 5' 9" (1.753 m)   PainSc: 0-No pain   PainLoc: Foot       Body mass index is 24.97 kg/m².    This a well-developed, well nourished patient in no acute distress.    Alert and oriented and cooperative to examination.       Physical Exam: Left Foot Exam     Gait:   Normal    Skin  Rash:   None  Scars:   healed    Inspection   Deformity:   None  Erythema:   None  Bruising:   none  Swelling:   none  Masses:  None    Range of Motion  Normal    Muscle Strength  Normal    Other   Tenderness:  none  Instability:  None  Sensation:   Normal    Vascular Exam   Capillary refill:     Normal        Imaging: X-rays ordered and reviewed today show a healed nondisplaced fracture of the first metatarsal.  The second metatarsal is minimally displaced in the AP view but slightly displaced in the lateral view. Some evidence of healing.       Assessment: Closed displaced fracture of metatarsal bone of left foot with routine healing, unspecified metatarsal, subsequent encounter    Crush injury of foot, left, sequela        Plan: He is pain loren and doing well. Will allow return to work with 2 weeks limited duty and then full release if tolerated. Return in 3 months for re-XR.    DISCLAIMER: This note may have been dictated using voice recognition software and may contain grammatical errors.     NOTE: Consult report sent to referring provider via [employer] EMR.  "

## (undated) DEVICE — SEE MEDLINE ITEM 152764

## (undated) DEVICE — UNDERGLOVES BIOGEL PI SIZE 8.5

## (undated) DEVICE — STRAP OR TABLE 5IN X 72IN

## (undated) DEVICE — SUT ETHILON 2-0 BLK PS-2

## (undated) DEVICE — PACK CUSTOM UNIV BASIN SLI

## (undated) DEVICE — SPLINT PLASTER FAST SET 5X30IN

## (undated) DEVICE — SUT SA85H SILK 2-0

## (undated) DEVICE — TOURNIQUET SB QC SP 18X4IN

## (undated) DEVICE — BANDAGE ACE 6 NSTRL

## (undated) DEVICE — GLOVE SURG ULTRA TOUCH 8.5

## (undated) DEVICE — GOWN SURG 2XL DISP TIE BACK

## (undated) DEVICE — SEE MEDLINE ITEM 157131

## (undated) DEVICE — PADDING CAST 6IN

## (undated) DEVICE — GLOVE SURG ULTRA TOUCH 9

## (undated) DEVICE — ELECTRODE REM PLYHSV RETURN 9

## (undated) DEVICE — SOL 9P NACL IRR PIC IL

## (undated) DEVICE — SLEEVE SCD EXPRESS CALF MEDIUM

## (undated) DEVICE — SEE MEDLINE ITEM 157117

## (undated) DEVICE — DRAPE STERI U-SHAPED 47X51IN

## (undated) DEVICE — PAD ABD 8X10 STERILE

## (undated) DEVICE — SEE MEDLINE ITEM 152622

## (undated) DEVICE — DRESSING XEROFORM FOIL PK 1X8